# Patient Record
Sex: FEMALE | Race: WHITE | NOT HISPANIC OR LATINO | URBAN - METROPOLITAN AREA
[De-identification: names, ages, dates, MRNs, and addresses within clinical notes are randomized per-mention and may not be internally consistent; named-entity substitution may affect disease eponyms.]

---

## 2019-01-08 ENCOUNTER — INPATIENT (INPATIENT)
Facility: HOSPITAL | Age: 79
LOS: 0 days | Discharge: HOME | DRG: 670 | End: 2019-01-09
Attending: UROLOGY | Admitting: UROLOGY
Payer: COMMERCIAL

## 2019-01-08 ENCOUNTER — RESULT REVIEW (OUTPATIENT)
Age: 79
End: 2019-01-08

## 2019-01-08 VITALS
OXYGEN SATURATION: 100 % | SYSTOLIC BLOOD PRESSURE: 144 MMHG | HEIGHT: 65 IN | TEMPERATURE: 98 F | HEART RATE: 75 BPM | RESPIRATION RATE: 16 BRPM | DIASTOLIC BLOOD PRESSURE: 73 MMHG | WEIGHT: 174.17 LBS

## 2019-01-08 DIAGNOSIS — D49.4 NEOPLASM OF UNSPECIFIED BEHAVIOR OF BLADDER: ICD-10-CM

## 2019-01-08 DIAGNOSIS — Z96.641 PRESENCE OF RIGHT ARTIFICIAL HIP JOINT: Chronic | ICD-10-CM

## 2019-01-08 DIAGNOSIS — D41.4 NEOPLASM OF UNCERTAIN BEHAVIOR OF BLADDER: Chronic | ICD-10-CM

## 2019-01-08 RX ORDER — DORZOLAMIDE HYDROCHLORIDE 20 MG/ML
1 SOLUTION/ DROPS OPHTHALMIC
Qty: 0 | Refills: 0 | Status: DISCONTINUED | OUTPATIENT
Start: 2019-01-08 | End: 2019-01-09

## 2019-01-08 RX ORDER — SODIUM CHLORIDE 9 MG/ML
1000 INJECTION INTRAMUSCULAR; INTRAVENOUS; SUBCUTANEOUS
Qty: 0 | Refills: 0 | Status: DISCONTINUED | OUTPATIENT
Start: 2019-01-08 | End: 2019-01-08

## 2019-01-08 RX ORDER — DOCUSATE SODIUM 100 MG
100 CAPSULE ORAL THREE TIMES A DAY
Qty: 0 | Refills: 0 | Status: DISCONTINUED | OUTPATIENT
Start: 2019-01-08 | End: 2019-01-09

## 2019-01-08 RX ORDER — LOSARTAN/HYDROCHLOROTHIAZIDE 100MG-25MG
1 TABLET ORAL
Qty: 0 | Refills: 0 | COMMUNITY

## 2019-01-08 RX ORDER — SIMVASTATIN 20 MG/1
1 TABLET, FILM COATED ORAL
Qty: 0 | Refills: 0 | COMMUNITY

## 2019-01-08 RX ORDER — PROGESTERONE 200 MG/1
1 CAPSULE, LIQUID FILLED ORAL
Qty: 0 | Refills: 0 | COMMUNITY

## 2019-01-08 RX ORDER — SODIUM CHLORIDE 9 MG/ML
1000 INJECTION INTRAMUSCULAR; INTRAVENOUS; SUBCUTANEOUS
Qty: 0 | Refills: 0 | Status: DISCONTINUED | OUTPATIENT
Start: 2019-01-08 | End: 2019-01-09

## 2019-01-08 RX ORDER — PANTOPRAZOLE SODIUM 20 MG/1
1 TABLET, DELAYED RELEASE ORAL
Qty: 0 | Refills: 0 | COMMUNITY

## 2019-01-08 RX ORDER — AMLODIPINE BESYLATE 2.5 MG/1
10 TABLET ORAL DAILY
Qty: 0 | Refills: 0 | Status: DISCONTINUED | OUTPATIENT
Start: 2019-01-08 | End: 2019-01-09

## 2019-01-08 RX ORDER — ASPIRIN/CALCIUM CARB/MAGNESIUM 324 MG
1 TABLET ORAL
Qty: 0 | Refills: 0 | COMMUNITY

## 2019-01-08 RX ORDER — TRAZODONE HCL 50 MG
0 TABLET ORAL
Qty: 0 | Refills: 0 | COMMUNITY

## 2019-01-08 RX ORDER — ESTROGENS, CONJUGATED 0.625 MG
1 TABLET ORAL
Qty: 0 | Refills: 0 | COMMUNITY

## 2019-01-08 RX ORDER — METOPROLOL TARTRATE 50 MG
1 TABLET ORAL
Qty: 0 | Refills: 0 | COMMUNITY

## 2019-01-08 RX ORDER — AMLODIPINE BESYLATE 2.5 MG/1
1 TABLET ORAL
Qty: 0 | Refills: 0 | COMMUNITY

## 2019-01-08 RX ORDER — ACETAMINOPHEN 500 MG
650 TABLET ORAL EVERY 6 HOURS
Qty: 0 | Refills: 0 | Status: DISCONTINUED | OUTPATIENT
Start: 2019-01-08 | End: 2019-01-09

## 2019-01-08 RX ORDER — ONDANSETRON 8 MG/1
4 TABLET, FILM COATED ORAL EVERY 6 HOURS
Qty: 0 | Refills: 0 | Status: DISCONTINUED | OUTPATIENT
Start: 2019-01-08 | End: 2019-01-09

## 2019-01-08 RX ORDER — METOPROLOL TARTRATE 50 MG
50 TABLET ORAL DAILY
Qty: 0 | Refills: 0 | Status: DISCONTINUED | OUTPATIENT
Start: 2019-01-08 | End: 2019-01-09

## 2019-01-08 RX ORDER — TRAZODONE HCL 50 MG
50 TABLET ORAL AT BEDTIME
Qty: 0 | Refills: 0 | Status: DISCONTINUED | OUTPATIENT
Start: 2019-01-08 | End: 2019-01-09

## 2019-01-08 RX ORDER — SIMVASTATIN 20 MG/1
20 TABLET, FILM COATED ORAL AT BEDTIME
Qty: 0 | Refills: 0 | Status: DISCONTINUED | OUTPATIENT
Start: 2019-01-08 | End: 2019-01-09

## 2019-01-08 RX ORDER — TIMOLOL 0.5 %
1 DROPS OPHTHALMIC (EYE)
Qty: 0 | Refills: 0 | Status: DISCONTINUED | OUTPATIENT
Start: 2019-01-08 | End: 2019-01-09

## 2019-01-08 RX ADMIN — Medication 1 DROP(S): at 22:46

## 2019-01-08 RX ADMIN — SIMVASTATIN 20 MILLIGRAM(S): 20 TABLET, FILM COATED ORAL at 22:46

## 2019-01-08 RX ADMIN — Medication 50 MILLIGRAM(S): at 22:46

## 2019-01-08 RX ADMIN — Medication 650 MILLIGRAM(S): at 20:54

## 2019-01-08 RX ADMIN — Medication 650 MILLIGRAM(S): at 21:50

## 2019-01-08 RX ADMIN — Medication 100 MILLIGRAM(S): at 20:55

## 2019-01-08 NOTE — BRIEF OPERATIVE NOTE - OPERATION/FINDINGS
bimanual exam performed, no abnormalities. cystoscopy revealed right lateral wall papillary tumor overlying right ureteral orifice. 3cm in size. Resected to base, no residual tumor. Right ureteral orifice was resected with possible tumor in ureteral tunnel. Ureteral orifice resected again until no suspicious lesion. +Efflux at end of procedure. High grade appearing.

## 2019-01-08 NOTE — PROGRESS NOTE ADULT - SUBJECTIVE AND OBJECTIVE BOX
UROLOGY POST OP NOTE (PAGER # [phone])    PROCEDURE: cysto TURBT    T(C): 36.6 (01-08-19 @ 16:43), Max: 36.6 (01-08-19 @ 16:43)  HR: 46 (01-08-19 @ 17:43) (46 - 75)  BP: 126/60 (01-08-19 @ 17:43) (100/56 - 144/73)  RR: 17 (01-08-19 @ 17:43) (11 - 20)  SpO2: 100% (01-08-19 @ 17:43) (97% - 100%)  Wt(kg): --  UO: adequate    SUBJECTIVE: c/o some discomfort with babb. No N/V. no CP/SOB./    ON PE: alert and awake    Abdomen: soft, NT, ND    : FC intact, urine clear

## 2019-01-08 NOTE — BRIEF OPERATIVE NOTE - PROCEDURE
<<-----Click on this checkbox to enter Procedure TURBT (transurethral resection of bladder tumor)  01/10/2019    Active  LÓPEZ

## 2019-01-08 NOTE — PROGRESS NOTE ADULT - PROBLEM SELECTOR PLAN 1
-stable  -OOB  -IS, SCD's  -Diet: regular  -Antibx: n/a  -I's & O's  -pain control  -IVF's  -home medications  -continue babb

## 2019-01-09 ENCOUNTER — TRANSCRIPTION ENCOUNTER (OUTPATIENT)
Age: 79
End: 2019-01-09

## 2019-01-09 VITALS
OXYGEN SATURATION: 99 % | RESPIRATION RATE: 16 BRPM | TEMPERATURE: 98 F | HEART RATE: 83 BPM | DIASTOLIC BLOOD PRESSURE: 58 MMHG | SYSTOLIC BLOOD PRESSURE: 122 MMHG

## 2019-01-09 LAB
ANION GAP SERPL CALC-SCNC: 13 MMOL/L — SIGNIFICANT CHANGE UP (ref 5–17)
BASOPHILS NFR BLD AUTO: 0.1 % — SIGNIFICANT CHANGE UP (ref 0–2)
BUN SERPL-MCNC: 13 MG/DL — SIGNIFICANT CHANGE UP (ref 7–23)
CALCIUM SERPL-MCNC: 9 MG/DL — SIGNIFICANT CHANGE UP (ref 8.4–10.5)
CHLORIDE SERPL-SCNC: 99 MMOL/L — SIGNIFICANT CHANGE UP (ref 96–108)
CO2 SERPL-SCNC: 23 MMOL/L — SIGNIFICANT CHANGE UP (ref 22–31)
CREAT SERPL-MCNC: 0.81 MG/DL — SIGNIFICANT CHANGE UP (ref 0.5–1.3)
EOSINOPHIL NFR BLD AUTO: 0 % — SIGNIFICANT CHANGE UP (ref 0–6)
GLUCOSE SERPL-MCNC: 118 MG/DL — HIGH (ref 70–99)
HCT VFR BLD CALC: 34.1 % — LOW (ref 34.5–45)
HGB BLD-MCNC: 11.3 G/DL — LOW (ref 11.5–15.5)
LYMPHOCYTES # BLD AUTO: 9.1 % — LOW (ref 13–44)
MAGNESIUM SERPL-MCNC: 1.9 MG/DL — SIGNIFICANT CHANGE UP (ref 1.6–2.6)
MCHC RBC-ENTMCNC: 29.3 PG — SIGNIFICANT CHANGE UP (ref 27–34)
MCHC RBC-ENTMCNC: 33.1 G/DL — SIGNIFICANT CHANGE UP (ref 32–36)
MCV RBC AUTO: 88.3 FL — SIGNIFICANT CHANGE UP (ref 80–100)
MONOCYTES NFR BLD AUTO: 5.4 % — SIGNIFICANT CHANGE UP (ref 2–14)
NEUTROPHILS NFR BLD AUTO: 85.4 % — HIGH (ref 43–77)
PHOSPHATE SERPL-MCNC: 3.6 MG/DL — SIGNIFICANT CHANGE UP (ref 2.5–4.5)
PLATELET # BLD AUTO: 224 K/UL — SIGNIFICANT CHANGE UP (ref 150–400)
POTASSIUM SERPL-MCNC: 4.3 MMOL/L — SIGNIFICANT CHANGE UP (ref 3.5–5.3)
POTASSIUM SERPL-SCNC: 4.3 MMOL/L — SIGNIFICANT CHANGE UP (ref 3.5–5.3)
RBC # BLD: 3.86 M/UL — SIGNIFICANT CHANGE UP (ref 3.8–5.2)
RBC # FLD: 13.2 % — SIGNIFICANT CHANGE UP (ref 10.3–16.9)
SODIUM SERPL-SCNC: 135 MMOL/L — SIGNIFICANT CHANGE UP (ref 135–145)
SURGICAL PATHOLOGY STUDY: SIGNIFICANT CHANGE UP
WBC # BLD: 7.6 K/UL — SIGNIFICANT CHANGE UP (ref 3.8–10.5)
WBC # FLD AUTO: 7.6 K/UL — SIGNIFICANT CHANGE UP (ref 3.8–10.5)

## 2019-01-09 PROCEDURE — 88305 TISSUE EXAM BY PATHOLOGIST: CPT

## 2019-01-09 PROCEDURE — 80048 BASIC METABOLIC PNL TOTAL CA: CPT

## 2019-01-09 PROCEDURE — 86900 BLOOD TYPING SEROLOGIC ABO: CPT

## 2019-01-09 PROCEDURE — 84100 ASSAY OF PHOSPHORUS: CPT

## 2019-01-09 PROCEDURE — 36415 COLL VENOUS BLD VENIPUNCTURE: CPT

## 2019-01-09 PROCEDURE — 85025 COMPLETE CBC W/AUTO DIFF WBC: CPT

## 2019-01-09 PROCEDURE — 86850 RBC ANTIBODY SCREEN: CPT

## 2019-01-09 PROCEDURE — 52235 CYSTOSCOPY AND TREATMENT: CPT

## 2019-01-09 PROCEDURE — 83735 ASSAY OF MAGNESIUM: CPT

## 2019-01-09 PROCEDURE — 86901 BLOOD TYPING SEROLOGIC RH(D): CPT

## 2019-01-09 RX ORDER — BENZOCAINE AND MENTHOL 5; 1 G/100ML; G/100ML
1 LIQUID ORAL EVERY 4 HOURS
Qty: 0 | Refills: 0 | Status: DISCONTINUED | OUTPATIENT
Start: 2019-01-09 | End: 2019-01-09

## 2019-01-09 RX ORDER — POLYETHYLENE GLYCOL 3350 17 G/17G
17 POWDER, FOR SOLUTION ORAL ONCE
Qty: 0 | Refills: 0 | Status: COMPLETED | OUTPATIENT
Start: 2019-01-09 | End: 2019-01-09

## 2019-01-09 RX ADMIN — Medication 50 MILLIGRAM(S): at 06:09

## 2019-01-09 RX ADMIN — DORZOLAMIDE HYDROCHLORIDE 1 DROP(S): 20 SOLUTION/ DROPS OPHTHALMIC at 06:10

## 2019-01-09 RX ADMIN — AMLODIPINE BESYLATE 10 MILLIGRAM(S): 2.5 TABLET ORAL at 07:07

## 2019-01-09 RX ADMIN — Medication 650 MILLIGRAM(S): at 06:47

## 2019-01-09 RX ADMIN — Medication 100 MILLIGRAM(S): at 06:09

## 2019-01-09 RX ADMIN — Medication 650 MILLIGRAM(S): at 06:11

## 2019-01-09 RX ADMIN — BENZOCAINE AND MENTHOL 1 LOZENGE: 5; 1 LIQUID ORAL at 01:00

## 2019-01-09 RX ADMIN — POLYETHYLENE GLYCOL 3350 17 GRAM(S): 17 POWDER, FOR SOLUTION ORAL at 09:47

## 2019-01-09 RX ADMIN — Medication 100 MILLIGRAM(S): at 13:02

## 2019-01-09 NOTE — DISCHARGE NOTE ADULT - CARE PLAN
Principal Discharge DX:	Bladder tumor  Goal:	improvement after surgery  Assessment and plan of treatment:	regular diet, activity as tolerated. No heavy lifting or straining. Stay well hydrated. If fever >100.4 or any change or worsening of symptoms please call doctor or report to ED. Make follow up appointment with Dr Holman. Please ask your cardiologist which blood pressure pills you should restart.

## 2019-01-09 NOTE — DISCHARGE NOTE ADULT - MEDICATION SUMMARY - MEDICATIONS TO TAKE
I will START or STAY ON the medications listed below when I get home from the hospital:    Aspirin Enteric Coated 325 mg oral delayed release tablet  -- 1 tab(s) by mouth once a day  -- Indication: For Home med    traZODone 50 mg oral tablet  -- orally once a day  -- Indication: For Home med    simvastatin 20 mg oral tablet  -- 1 tab(s) by mouth once a day (at bedtime)  -- Indication: For Home med    losartan-hydrochlorothiazide 100mg-12.5mg oral tablet  -- 1 tab(s) by mouth once a day  -- Indication: For Home med    Metoprolol Succinate ER 50 mg oral tablet, extended release  -- 1 tab(s) by mouth once a day  -- Indication: For Home med    amLODIPine 10 mg oral tablet  -- 1 tab(s) by mouth once a day  -- Indication: For Home med    pantoprazole 40 mg oral delayed release tablet  -- 1 tab(s) by mouth once a day  -- Indication: For Home med    Premarin 0.3 mg oral tablet  -- 1 tab(s) by mouth once a day  -- Indication: For Home med    progesterone 100 mg oral capsule  -- 1 cap(s) by mouth once a day (at bedtime)  -- Indication: For Home med

## 2019-01-09 NOTE — DISCHARGE NOTE ADULT - PATIENT PORTAL LINK FT
You can access the [facility] Patient Portal, offered by [facility], by registering with the following website: [email or web address]

## 2019-01-09 NOTE — DISCHARGE NOTE ADULT - HOSPITAL COURSE
78F hx htn, hld, bladder tumor underwent TURBT 1/8. Tolerated procedure well. AVSS, afebrile and hemodynamically stable.

## 2019-01-09 NOTE — DISCHARGE NOTE ADULT - CASE MANAGER'S NAME
Smita Ford RN [facility] [place] [phone]

## 2019-01-09 NOTE — DISCHARGE NOTE ADULT - PLAN OF CARE
improvement after surgery regular diet, activity as tolerated. No heavy lifting or straining. Stay well hydrated. If fever >100.4 or any change or worsening of symptoms please call doctor or report to ED. Make follow up appointment with Dr Holman. Please ask your cardiologist which blood pressure pills you should restart.

## 2019-01-09 NOTE — PROGRESS NOTE ADULT - SUBJECTIVE AND OBJECTIVE BOX
INTERVAL HPI/OVERNIGHT EVENTS:  No acute events overnight.    VITALS:    T(F): 97 (01-09-19 @ 00:25), Max: 97.8 (01-08-19 @ 16:43)  HR: 55 (01-09-19 @ 00:25) (46 - 75)  BP: 115/57 (01-09-19 @ 00:25) (100/56 - 156/66)  RR: 16 (01-09-19 @ 00:25) (11 - 20)  SpO2: 99% (01-09-19 @ 00:25) (97% - 100%)  Wt(kg): --    I&O's Detail    08 Jan 2019 07:01  -  09 Jan 2019 04:29  --------------------------------------------------------  IN:    Oral Fluid: 480 mL    sodium chloride 0.9%: 250 mL    sodium chloride 0.9%.: 675 mL  Total IN: 1405 mL    OUT:    Indwelling Catheter - Urethral: 520 mL  Total OUT: 520 mL    Total NET: 885 mL          MEDICATIONS:    ANTIBIOTICS:      PAIN CONTROL:  acetaminophen   Tablet .. 650 milliGRAM(s) Oral every 6 hours PRN  ondansetron Injectable 4 milliGRAM(s) IV Push every 6 hours PRN  traZODone 50 milliGRAM(s) Oral at bedtime       MEDS:      HEME/ONC        PHYSICAL EXAM:  General: No acute distress.  Alert and Oriented  Abdominal Exam: soft nt/nd.   Exam: Lynch cath in place draining well.      LABS:                RADIOLOGY & ADDITIONAL TESTS:    ASSESSMENT: 78yFemale w/ Bladder tumor s/p TURBT (01/082019).       PLAN:  Diet: Reg  Pain control  Monitor Urine Output  DVT ppx: SCD  Cont Abx  OOB/IS

## 2019-01-18 DIAGNOSIS — C67.6 MALIGNANT NEOPLASM OF URETERIC ORIFICE: ICD-10-CM

## 2019-01-18 DIAGNOSIS — C67.9 MALIGNANT NEOPLASM OF BLADDER, UNSPECIFIED: ICD-10-CM

## 2019-01-18 DIAGNOSIS — I10 ESSENTIAL (PRIMARY) HYPERTENSION: ICD-10-CM

## 2019-01-18 DIAGNOSIS — E78.5 HYPERLIPIDEMIA, UNSPECIFIED: ICD-10-CM

## 2019-01-18 DIAGNOSIS — Z79.82 LONG TERM (CURRENT) USE OF ASPIRIN: ICD-10-CM

## 2019-01-18 DIAGNOSIS — K21.9 GASTRO-ESOPHAGEAL REFLUX DISEASE WITHOUT ESOPHAGITIS: ICD-10-CM

## 2019-01-18 DIAGNOSIS — M19.90 UNSPECIFIED OSTEOARTHRITIS, UNSPECIFIED SITE: ICD-10-CM

## 2019-10-25 PROBLEM — E78.5 HYPERLIPIDEMIA, UNSPECIFIED: Chronic | Status: ACTIVE | Noted: 2019-01-08

## 2019-10-25 PROBLEM — Z96.641 PRESENCE OF RIGHT ARTIFICIAL HIP JOINT: Chronic | Status: ACTIVE | Noted: 2019-01-08

## 2019-10-25 PROBLEM — D49.4 NEOPLASM OF UNSPECIFIED BEHAVIOR OF BLADDER: Chronic | Status: ACTIVE | Noted: 2019-01-08

## 2019-10-25 PROBLEM — I10 ESSENTIAL (PRIMARY) HYPERTENSION: Chronic | Status: ACTIVE | Noted: 2019-01-08

## 2019-11-25 ENCOUNTER — RECORD ABSTRACTING (OUTPATIENT)
Age: 79
End: 2019-11-25

## 2019-11-25 DIAGNOSIS — Z78.9 OTHER SPECIFIED HEALTH STATUS: ICD-10-CM

## 2019-11-25 DIAGNOSIS — Z81.8 FAMILY HISTORY OF OTHER MENTAL AND BEHAVIORAL DISORDERS: ICD-10-CM

## 2019-11-25 RX ORDER — TRAZODONE HYDROCHLORIDE 50 MG/1
50 TABLET ORAL
Refills: 0 | Status: ACTIVE | COMMUNITY

## 2019-11-25 RX ORDER — PROGESTERONE 100 MG/1
100 CAPSULE ORAL
Refills: 0 | Status: ACTIVE | COMMUNITY

## 2019-11-25 RX ORDER — SIMVASTATIN 20 MG/1
20 TABLET, FILM COATED ORAL DAILY
Refills: 0 | Status: ACTIVE | COMMUNITY

## 2019-11-25 RX ORDER — TIMOLOL MALEATE 5 MG/ML
0.5 SOLUTION OPHTHALMIC
Refills: 0 | Status: ACTIVE | COMMUNITY

## 2019-11-25 RX ORDER — ESTROGENS, CONJUGATED 0.3 MG/1
0.3 TABLET, FILM COATED ORAL DAILY
Refills: 0 | Status: ACTIVE | COMMUNITY

## 2019-11-25 RX ORDER — AMLODIPINE BESYLATE 10 MG/1
10 TABLET ORAL DAILY
Refills: 0 | Status: ACTIVE | COMMUNITY

## 2019-11-25 RX ORDER — LOSARTAN POTASSIUM AND HYDROCHLOROTHIAZIDE 25; 100 MG/1; MG/1
100-25 TABLET ORAL DAILY
Refills: 0 | Status: ACTIVE | COMMUNITY

## 2020-01-09 ENCOUNTER — APPOINTMENT (OUTPATIENT)
Dept: UROLOGY | Facility: CLINIC | Age: 80
End: 2020-01-09
Payer: MEDICARE

## 2020-01-09 VITALS
SYSTOLIC BLOOD PRESSURE: 160 MMHG | WEIGHT: 159 LBS | HEART RATE: 64 BPM | BODY MASS INDEX: 26.49 KG/M2 | DIASTOLIC BLOOD PRESSURE: 80 MMHG | TEMPERATURE: 97.5 F | HEIGHT: 65 IN

## 2020-01-09 DIAGNOSIS — Z80.52 FAMILY HISTORY OF MALIGNANT NEOPLASM OF BLADDER: ICD-10-CM

## 2020-01-09 DIAGNOSIS — Z63.4 DISAPPEARANCE AND DEATH OF FAMILY MEMBER: ICD-10-CM

## 2020-01-09 DIAGNOSIS — Z00.00 ENCOUNTER FOR GENERAL ADULT MEDICAL EXAMINATION W/OUT ABNORMAL FINDINGS: ICD-10-CM

## 2020-01-09 DIAGNOSIS — D49.4 NEOPLASM OF UNSPECIFIED BEHAVIOR OF BLADDER: ICD-10-CM

## 2020-01-09 PROCEDURE — 81003 URINALYSIS AUTO W/O SCOPE: CPT | Mod: QW

## 2020-01-09 PROCEDURE — 99214 OFFICE O/P EST MOD 30 MIN: CPT | Mod: 25

## 2020-01-09 PROCEDURE — 52000 CYSTOURETHROSCOPY: CPT

## 2020-01-09 RX ORDER — METOPROLOL TARTRATE 75 MG/1
TABLET, FILM COATED ORAL
Refills: 0 | Status: ACTIVE | COMMUNITY

## 2020-01-09 SDOH — SOCIAL STABILITY - SOCIAL INSECURITY: DISSAPEARANCE AND DEATH OF FAMILY MEMBER: Z63.4

## 2020-01-09 NOTE — PHYSICAL EXAM
[General Appearance - Well Developed] : well developed [General Appearance - Well Nourished] : well nourished [Normal Appearance] : normal appearance [Well Groomed] : well groomed [General Appearance - In No Acute Distress] : no acute distress [Abdomen Soft] : soft [Abdomen Tenderness] : non-tender [Skin Color & Pigmentation] : normal skin color and pigmentation [Heart Rate And Rhythm] : Heart rate and rhythm were normal [] : no respiratory distress [Oriented To Time, Place, And Person] : oriented to person, place, and time [Affect] : the affect was normal [Mood] : the mood was normal [Not Anxious] : not anxious [No Focal Deficits] : no focal deficits [Normal Station and Gait] : the gait and station were normal for the patient's age [Inguinal Lymph Nodes Enlarged Bilaterally] : inguinal

## 2020-01-12 LAB
BILIRUB UR QL STRIP: NEGATIVE
CLARITY UR: NORMAL
COLLECTION METHOD: NORMAL
GLUCOSE UR-MCNC: NEGATIVE
HCG UR QL: 0.2 EU/DL
HGB UR QL STRIP.AUTO: NEGATIVE
KETONES UR-MCNC: NEGATIVE
LEUKOCYTE ESTERASE UR QL STRIP: NEGATIVE
NITRITE UR QL STRIP: NEGATIVE
PH UR STRIP: 5.5
PROT UR STRIP-MCNC: NEGATIVE
SP GR UR STRIP: 1.02
URINE CYTOLOGY: NORMAL

## 2020-01-12 NOTE — HISTORY OF PRESENT ILLNESS
[FreeTextEntry1] : 79F, with history of low grade (recent high grade) non-invasive Bladder Ca, who was followed by Dr. Gao. Last seen on 07/09/2019 at which time had surveillance cystoscopy that was negative. Her initial diagnosis was in February of 2011, as part of a work up for hydronephrosis. After resection, she had intravesical mitomycin and she recalls having BCG instillations at that time followed by timely surveillances. 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 She had BCG therapy instillations, per note. Her only and last imaging was in January 2011. \par \par She also has in the past been experiencing lower urinary tract irritative symptoms, not right now. Denies any frequency or urgency, baseline nocturia. Denies gross hematuria.\par UA dipstick today is negative.

## 2020-01-13 LAB — BACTERIA UR CULT: NORMAL

## 2020-01-16 ENCOUNTER — APPOINTMENT (OUTPATIENT)
Dept: UROLOGY | Facility: CLINIC | Age: 80
End: 2020-01-16

## 2020-09-15 ENCOUNTER — APPOINTMENT (OUTPATIENT)
Dept: UROLOGY | Facility: CLINIC | Age: 80
End: 2020-09-15

## 2020-09-21 ENCOUNTER — APPOINTMENT (OUTPATIENT)
Dept: UROLOGY | Facility: CLINIC | Age: 80
End: 2020-09-21

## 2020-09-21 ENCOUNTER — APPOINTMENT (OUTPATIENT)
Dept: UROLOGY | Facility: CLINIC | Age: 80
End: 2020-09-21
Payer: MEDICARE

## 2020-09-21 VITALS — SYSTOLIC BLOOD PRESSURE: 143 MMHG | TEMPERATURE: 97.2 F | DIASTOLIC BLOOD PRESSURE: 76 MMHG | HEART RATE: 56 BPM

## 2020-09-21 LAB
BILIRUB UR QL STRIP: NORMAL
CLARITY UR: CLEAR
COLLECTION METHOD: NORMAL
GLUCOSE UR-MCNC: NORMAL
HCG UR QL: 0.2 EU/DL
HGB UR QL STRIP.AUTO: NORMAL
KETONES UR-MCNC: NORMAL
LEUKOCYTE ESTERASE UR QL STRIP: NORMAL
NITRITE UR QL STRIP: NORMAL
PH UR STRIP: 6
PROT UR STRIP-MCNC: NORMAL
SP GR UR STRIP: 1.01

## 2020-09-21 PROCEDURE — 81003 URINALYSIS AUTO W/O SCOPE: CPT | Mod: QW

## 2020-09-21 PROCEDURE — 52000 CYSTOURETHROSCOPY: CPT

## 2020-09-21 PROCEDURE — 99214 OFFICE O/P EST MOD 30 MIN: CPT | Mod: 25

## 2020-09-21 NOTE — PHYSICAL EXAM
[General Appearance - Well Developed] : well developed [General Appearance - Well Nourished] : well nourished [Normal Appearance] : normal appearance [Well Groomed] : well groomed [General Appearance - In No Acute Distress] : no acute distress [Abdomen Soft] : soft [Abdomen Tenderness] : non-tender [Skin Color & Pigmentation] : normal skin color and pigmentation [Heart Rate And Rhythm] : Heart rate and rhythm were normal [] : no respiratory distress [Oriented To Time, Place, And Person] : oriented to person, place, and time [Affect] : the affect was normal [Mood] : the mood was normal [Not Anxious] : not anxious [Normal Station and Gait] : the gait and station were normal for the patient's age [No Focal Deficits] : no focal deficits [Inguinal Lymph Nodes Enlarged Bilaterally] : inguinal

## 2020-09-22 NOTE — HISTORY OF PRESENT ILLNESS
[FreeTextEntry1] : 80 F, with history of low grade (recent high grade) non-invasive Bladder Ca, who was followed by Dr. Gao. Last seen by me 01/09/2020 at which time had surveillance cystoscopy that was negative. Her initial diagnosis was in February of 2011, as part of a work up for hydronephrosis. After resection, she had intravesical mitomycin and she recalls having BCG instillations at that time followed by timely surveillances. 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 She had BCG therapy instillations, per note. Her only and last imaging was in January 2011. \par \par She presents today for follow up cysto,she denies any complaints today. Doing well . Denies any frequency or urgency, baseline nocturia. Denies gross hematuria.\par \par 01/2020 UCx and Cytology negative\par

## 2020-09-22 NOTE — ASSESSMENT
[FreeTextEntry1] : 79 yo F with hx of recurrent bladder cancer, most recently in 2019 which was high-grade.  She presents today for routine cystoscopy. Pt is doing well,cystoscopy was unremarkable.She does not have any current complaints at this time.  She will follow up in 6 months for a surveillance cystoscopy.  \par

## 2020-09-23 LAB — BACTERIA UR CULT: NORMAL

## 2021-01-23 NOTE — BRIEF OPERATIVE NOTE - PRIMARY SURGEON
Manda 73y female above PMH seen recently for n/v/abd pain and discharged after extensive workup including labs, CT, and ultrasound returns with pain which has migrated to her lower abdomen, reports loose stool with blood, no fever, Is eating and drinking, on exam vital signs appreciated, head nc/at, perrla, EOMI, conj pink op clear neck supple cor rrr lungs cta abd +bs, oft with mild lower abd ttp no g/r, brown nb stool on DEWAYNE no c/c/e pulses equal calves nontender neuro intact, labs reviewed, pt feels better and prefers discharge to f/u with Anna in 2d, will d/c. Patient counseled regarding conditions which should prompt return.

## 2021-03-22 ENCOUNTER — APPOINTMENT (OUTPATIENT)
Dept: UROLOGY | Facility: CLINIC | Age: 81
End: 2021-03-22

## 2021-03-31 ENCOUNTER — APPOINTMENT (OUTPATIENT)
Dept: UROLOGY | Facility: CLINIC | Age: 81
End: 2021-03-31
Payer: MEDICARE

## 2021-03-31 VITALS
OXYGEN SATURATION: 95 % | HEART RATE: 62 BPM | TEMPERATURE: 97.1 F | DIASTOLIC BLOOD PRESSURE: 76 MMHG | SYSTOLIC BLOOD PRESSURE: 172 MMHG

## 2021-03-31 LAB
BILIRUB UR QL STRIP: NORMAL
CLARITY UR: CLEAR
COLLECTION METHOD: NORMAL
GLUCOSE UR-MCNC: NORMAL
HCG UR QL: 0.2 EU/DL
HGB UR QL STRIP.AUTO: NORMAL
KETONES UR-MCNC: NORMAL
LEUKOCYTE ESTERASE UR QL STRIP: NORMAL
NITRITE UR QL STRIP: NORMAL
PH UR STRIP: 5.5
PROT UR STRIP-MCNC: NORMAL
SP GR UR STRIP: 1.01

## 2021-03-31 PROCEDURE — 81003 URINALYSIS AUTO W/O SCOPE: CPT | Mod: QW

## 2021-03-31 PROCEDURE — 52000 CYSTOURETHROSCOPY: CPT

## 2021-03-31 PROCEDURE — 99215 OFFICE O/P EST HI 40 MIN: CPT | Mod: 25

## 2021-03-31 NOTE — ASSESSMENT
[FreeTextEntry1] : 81 yo F with hx of recurrent bladder cancer, most recently in 2019 which was high-grade.  She presents today for routine cystoscopy. Pt is doing well,cystoscopy was unremarkable. She does not have any current complaints at this time.  She will follow up in 6 months for a surveillance cystoscopy.  If next cystoscopy negative, will decrease frequency of surveillance cystoscopies to once per year. Will send urine for culture. \par

## 2021-03-31 NOTE — HISTORY OF PRESENT ILLNESS
[FreeTextEntry1] : 80 F, with history of low grade (recent high grade) non-invasive Bladder Ca, who was followed by Dr. Gao and presents today for surveillance cystoscopy evaluation. \par \par Full Hx:\par Seen by me 01/09/2020 at which time had surveillance cystoscopy that was negative. Her initial diagnosis was in February of 2011, as part of a work up for hydronephrosis. After resection, she had intravesical mitomycin and she recalls having BCG instillations at that time followed by timely surveillances. 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 She had BCG therapy instillations, per note. Her only and last imaging was in January 2011. Has surveillance cystoscopy 1/09/2020 which was negative. She returned 9/21/2020 for repeat cystoscopy which again was negative. She denied any complaints. Doing well . Denied any frequency or urgency, baseline nocturia. Denied gross hematuria.\par \par Udip:  negative\par 09/21/2020 UCx negative\par 01/2020 UCx and Cytology negative\par

## 2021-03-31 NOTE — PHYSICAL EXAM
[General Appearance - Well Developed] : well developed [General Appearance - Well Nourished] : well nourished [Normal Appearance] : normal appearance [Well Groomed] : well groomed [General Appearance - In No Acute Distress] : no acute distress [Abdomen Soft] : soft [Abdomen Tenderness] : non-tender [Skin Color & Pigmentation] : normal skin color and pigmentation [Heart Rate And Rhythm] : Heart rate and rhythm were normal [Oriented To Time, Place, And Person] : oriented to person, place, and time [] : no respiratory distress [Affect] : the affect was normal [Mood] : the mood was normal [Not Anxious] : not anxious [Normal Station and Gait] : the gait and station were normal for the patient's age [No Focal Deficits] : no focal deficits [Inguinal Lymph Nodes Enlarged Bilaterally] : inguinal

## 2021-04-05 ENCOUNTER — NON-APPOINTMENT (OUTPATIENT)
Age: 81
End: 2021-04-05

## 2021-04-05 LAB
BACTERIA UR CULT: NORMAL
URINE CYTOLOGY: NORMAL

## 2021-09-15 ENCOUNTER — APPOINTMENT (OUTPATIENT)
Dept: UROLOGY | Facility: CLINIC | Age: 81
End: 2021-09-15
Payer: MEDICARE

## 2021-09-15 ENCOUNTER — APPOINTMENT (OUTPATIENT)
Dept: UROLOGY | Facility: CLINIC | Age: 81
End: 2021-09-15

## 2021-09-15 PROCEDURE — 99214 OFFICE O/P EST MOD 30 MIN: CPT | Mod: 25

## 2021-09-15 PROCEDURE — 52000 CYSTOURETHROSCOPY: CPT

## 2021-09-16 LAB — URINE CYTOLOGY: NORMAL

## 2021-09-16 NOTE — ASSESSMENT
[FreeTextEntry1] : 81 yo F with hx of recurrent bladder cancer, most recently in 2019 which was high-grade.  She presents today for routine cystoscopy. Pt is doing well and her cystoscopy today was unremarkable. She does not have any current complaints at this time.  I look forward to seeing her in 6 months for a surveillance cystoscopy.   If her cystoscopy at that time is negative, I will then see her yearly.  \par \par I have sent today's urine for culture and cytology. \par

## 2021-09-16 NOTE — HISTORY OF PRESENT ILLNESS
[FreeTextEntry1] : 80 F, with history of high grade non-invasive Bladder Ca, who was followed by Dr. Holman. Last seen by me 01/09/2020 at which time had surveillance cystoscopy that was negative. Her initial diagnosis was in February of 2011, as part of a work up for hydronephrosis. After resection, she had intravesical mitomycin and she recalls having BCG instillations at that time followed by timely surveillances. 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 She had BCG therapy instillations, per note. Her only and last imaging was in January 2011. \par \par She presents today for follow up cystoscopy. \par \par Udip: negative \par Cystoscopy: 915/21--\par \par \par \par 01/2020 UCx and Cytology negative\par

## 2021-09-17 ENCOUNTER — NON-APPOINTMENT (OUTPATIENT)
Age: 81
End: 2021-09-17

## 2021-09-17 LAB — BACTERIA UR CULT: NORMAL

## 2022-02-16 ENCOUNTER — RESULT CHARGE (OUTPATIENT)
Age: 82
End: 2022-02-16

## 2022-02-16 ENCOUNTER — APPOINTMENT (OUTPATIENT)
Dept: UROLOGY | Facility: CLINIC | Age: 82
End: 2022-02-16
Payer: MEDICARE

## 2022-02-16 VITALS
HEART RATE: 60 BPM | DIASTOLIC BLOOD PRESSURE: 77 MMHG | OXYGEN SATURATION: 99 % | SYSTOLIC BLOOD PRESSURE: 137 MMHG | TEMPERATURE: 97.6 F

## 2022-02-16 LAB
BILIRUB UR QL STRIP: NORMAL
CLARITY UR: CLEAR
COLLECTION METHOD: NORMAL
GLUCOSE UR-MCNC: NORMAL
HCG UR QL: 0.2 EU/DL
HGB UR QL STRIP.AUTO: NORMAL
KETONES UR-MCNC: NORMAL
LEUKOCYTE ESTERASE UR QL STRIP: NORMAL
NITRITE UR QL STRIP: NORMAL
PH UR STRIP: 6
PROT UR STRIP-MCNC: NORMAL
SP GR UR STRIP: <=1.005

## 2022-02-16 PROCEDURE — 52000 CYSTOURETHROSCOPY: CPT

## 2022-02-18 LAB
APPEARANCE: CLEAR
BACTERIA UR CULT: NORMAL
BACTERIA: NEGATIVE
BILIRUBIN URINE: NEGATIVE
BLOOD URINE: NEGATIVE
COLOR: NORMAL
GLUCOSE QUALITATIVE U: NEGATIVE
HYALINE CASTS: 1 /LPF
KETONES URINE: NEGATIVE
LEUKOCYTE ESTERASE URINE: NEGATIVE
MICROSCOPIC-UA: NORMAL
NITRITE URINE: NEGATIVE
PH URINE: 6
PROTEIN URINE: NEGATIVE
RED BLOOD CELLS URINE: 1 /HPF
SPECIFIC GRAVITY URINE: 1.01
SQUAMOUS EPITHELIAL CELLS: 3 /HPF
UROBILINOGEN URINE: NORMAL
WHITE BLOOD CELLS URINE: 0 /HPF

## 2022-02-23 LAB — URINE CYTOLOGY: NORMAL

## 2022-04-06 NOTE — ADDENDUM
[FreeTextEntry1] : A portion of this note was written by [Arleth Arciniega] on 02/16/2022 acting as a scribe for Dr. Meyers. \par \par I have personally reviewed the chart and agree that the record accurately reflects my personal performance of the history, physical exam, assessment, and plan.

## 2022-04-06 NOTE — ASSESSMENT
[FreeTextEntry1] : 79 yo F with hx of recurrent bladder cancer, most recently in 2019 which was high-grade.  She presents today for routine cystoscopy. Pt is doing well and her cystoscopy today was unremarkable. She does not have any current complaints at this time.  I look forward to seeing her in a year for repeat routine cystoscopy.  \par \par I have sent today's urine for culture and UA.\par

## 2023-03-14 ENCOUNTER — APPOINTMENT (OUTPATIENT)
Dept: UROLOGY | Facility: CLINIC | Age: 83
End: 2023-03-14

## 2023-04-25 ENCOUNTER — APPOINTMENT (OUTPATIENT)
Dept: UROLOGY | Facility: CLINIC | Age: 83
End: 2023-04-25
Payer: MEDICARE

## 2023-04-25 VITALS
HEIGHT: 65 IN | SYSTOLIC BLOOD PRESSURE: 145 MMHG | BODY MASS INDEX: 27.49 KG/M2 | WEIGHT: 165 LBS | RESPIRATION RATE: 18 BRPM | TEMPERATURE: 97.4 F | DIASTOLIC BLOOD PRESSURE: 77 MMHG | OXYGEN SATURATION: 98 % | HEART RATE: 65 BPM

## 2023-04-25 PROCEDURE — 52000 CYSTOURETHROSCOPY: CPT

## 2023-04-28 NOTE — ADDENDUM
[FreeTextEntry1] : A portion of this note was written by [Cameron Huitron] on 04/25/2023 acting as a scribe for Dr. Meyers. \par \par I have personally reviewed the chart and agree that the record accurately reflects my personal performance of the history, physical exam, assessment, and plan.\par

## 2023-04-28 NOTE — HISTORY OF PRESENT ILLNESS
[FreeTextEntry1] : 4/25/23: Ms. LUCA DOWNING comes in today for her Urologic follow up. Pt is a 83 yo F with hx of recurrent high-grade bladder cancer, most recent resection was in 2019.  She presents today for surveilance cystoscopy.   She denies urinary symptoms. \par \par 80 F, with history of high grade non-invasive Bladder Ca, who was followed by Dr. Holman. Last seen by me 01/09/2020 at which time had surveillance cystoscopy that was negative. Her initial diagnosis was in February of 2011, as part of a work up for hydronephrosis. After resection, she had intravesical mitomycin and she recalls having BCG instillations at that time followed by timely surveillances. 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 She had BCG therapy instillations, per note. Her only and last imaging was in January 2011. \par \par She presents today for follow up cystoscopy. \par \par Udip: negative \par Cystoscopy: 915/21--\par \par \par \par 01/2020 UCx and Cytology negative\par

## 2023-04-28 NOTE — ASSESSMENT
[FreeTextEntry1] : Pt is a 83 yo F with hx of recurrent high grade bladder cancer, most recent resection in 2019.  She presents today for routine cystoscopy. \par \par Ms. LUCA DOWNING comes in today for her surveillance cystoscopy. \par \par Fortunately, today's cystoscopy was negative. Ms. DOWNING tolerated the procedure well and will return to see me in a year for annual bladder cancer surveillance. \par \par I have sent today's urine for culture and cytology.\par \par Patient expressed understanding.\par \par

## 2023-05-02 LAB
BACTERIA UR CULT: NORMAL
URINE CYTOLOGY: NORMAL

## 2024-04-25 ENCOUNTER — APPOINTMENT (OUTPATIENT)
Dept: UROLOGY | Facility: CLINIC | Age: 84
End: 2024-04-25
Payer: MEDICARE

## 2024-04-25 VITALS
OXYGEN SATURATION: 99 % | TEMPERATURE: 97.6 F | HEART RATE: 68 BPM | DIASTOLIC BLOOD PRESSURE: 79 MMHG | SYSTOLIC BLOOD PRESSURE: 136 MMHG

## 2024-04-25 LAB
BILIRUB UR QL STRIP: NORMAL
CLARITY UR: CLEAR
COLLECTION METHOD: NORMAL
GLUCOSE UR-MCNC: NORMAL
HCG UR QL: 0.2 EU/DL
HGB UR QL STRIP.AUTO: NORMAL
KETONES UR-MCNC: NORMAL
LEUKOCYTE ESTERASE UR QL STRIP: NORMAL
NITRITE UR QL STRIP: NORMAL
PH UR STRIP: 6
PROT UR STRIP-MCNC: NORMAL
SP GR UR STRIP: 1.02

## 2024-04-25 PROCEDURE — 81003 URINALYSIS AUTO W/O SCOPE: CPT | Mod: QW

## 2024-04-25 PROCEDURE — 52000 CYSTOURETHROSCOPY: CPT

## 2024-04-25 NOTE — ADDENDUM
[FreeTextEntry1] : A portion of this note was written by Marilyn Franklin on 04/25/2024 acting as a scribe for Dr. Meyers.   I have personally reviewed the chart and agree that the record accurately reflects my personal performance of the history, physical exam, assessment, and plan.

## 2024-04-25 NOTE — HISTORY OF PRESENT ILLNESS
[FreeTextEntry1] : 4/25/24: Ms. LUCA DOWNING comes in today for her Urologic follow up. Pt is a 83 yo F with hx of recurrent high-grade bladder cancer, most recent resection was in 2019.  She presents today for surveillance cystoscopy.   She denies urinary symptoms.    4/25/23: Ms. LUCA DOWNING comes in today for her Urologic follow up. Pt is a 83 yo F with hx of recurrent high-grade bladder cancer, most recent resection was in 2019.  She presents today for surveillance cystoscopy.   She denies urinary symptoms.   80 F, with history of high grade non-invasive Bladder Ca, who was followed by Dr. Holman. Last seen by me 01/09/2020 at which time had surveillance cystoscopy that was negative. Her initial diagnosis was in February of 2011, as part of a work up for hydronephrosis. After resection, she had intravesical mitomycin and she recalls having BCG instillations at that time followed by timely surveillances. 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 She had BCG therapy instillations, per note. Her only and last imaging was in January 2011.   She presents today for follow up cystoscopy.   Udip: negative  Cystoscopy: 915/21--    01/2020 UCx and Cytology negative

## 2024-04-25 NOTE — ASSESSMENT
[FreeTextEntry1] : Pt is a 81 yo F with hx of recurrent high grade bladder cancer, most recent resection in 2019.  She presents today for routine cystoscopy. \par  \par  Ms. LUCA DOWNING comes in today for her surveillance cystoscopy. \par  \par  Fortunately, today's cystoscopy was negative. Ms. DOWNING tolerated the procedure well and will return to see me in a year for annual bladder cancer surveillance. \par  \par  I have sent today's urine for culture and cytology.\par  \par  Patient expressed understanding.\par  \par

## 2024-04-29 LAB
BACTERIA UR CULT: NORMAL
URINE CYTOLOGY: NORMAL

## 2025-05-08 ENCOUNTER — APPOINTMENT (OUTPATIENT)
Dept: UROLOGY | Facility: CLINIC | Age: 85
End: 2025-05-08

## 2025-05-08 ENCOUNTER — NON-APPOINTMENT (OUTPATIENT)
Age: 85
End: 2025-05-08

## 2025-05-08 VITALS
OXYGEN SATURATION: 98 % | DIASTOLIC BLOOD PRESSURE: 70 MMHG | TEMPERATURE: 97.5 F | SYSTOLIC BLOOD PRESSURE: 132 MMHG | HEART RATE: 64 BPM

## 2025-05-08 DIAGNOSIS — R39.9 UNSPECIFIED SYMPTOMS AND SIGNS INVOLVING THE GENITOURINARY SYSTEM: ICD-10-CM

## 2025-05-08 PROCEDURE — 52000 CYSTOURETHROSCOPY: CPT

## 2025-05-08 PROCEDURE — 81003 URINALYSIS AUTO W/O SCOPE: CPT | Mod: QW

## 2025-05-13 LAB — BACTERIA UR CULT: NORMAL
